# Patient Record
Sex: MALE | Race: WHITE | NOT HISPANIC OR LATINO | Employment: UNEMPLOYED | URBAN - METROPOLITAN AREA
[De-identification: names, ages, dates, MRNs, and addresses within clinical notes are randomized per-mention and may not be internally consistent; named-entity substitution may affect disease eponyms.]

---

## 2017-01-19 ENCOUNTER — OFFICE VISIT (OUTPATIENT)
Dept: UROLOGY | Facility: CLINIC | Age: 47
End: 2017-01-19
Payer: COMMERCIAL

## 2017-01-19 VITALS
HEIGHT: 73 IN | WEIGHT: 225.75 LBS | BODY MASS INDEX: 29.92 KG/M2 | HEART RATE: 59 BPM | DIASTOLIC BLOOD PRESSURE: 89 MMHG | SYSTOLIC BLOOD PRESSURE: 140 MMHG

## 2017-01-19 DIAGNOSIS — R79.89 LOW TESTOSTERONE: Primary | ICD-10-CM

## 2017-01-19 PROCEDURE — 1159F MED LIST DOCD IN RCRD: CPT | Mod: S$GLB,,, | Performed by: UROLOGY

## 2017-01-19 PROCEDURE — 99999 PR PBB SHADOW E&M-NEW PATIENT-LVL III: CPT | Mod: PBBFAC,,, | Performed by: UROLOGY

## 2017-01-19 PROCEDURE — 99213 OFFICE O/P EST LOW 20 MIN: CPT | Mod: S$GLB,,, | Performed by: UROLOGY

## 2017-01-19 RX ORDER — TESTOSTERONE CYPIONATE 200 MG/ML
250 INJECTION, SOLUTION INTRAMUSCULAR
Qty: 10 ML | Refills: 2 | Status: SHIPPED | OUTPATIENT
Start: 2017-01-19 | End: 2017-07-20

## 2017-01-19 RX ORDER — LISINOPRIL 40 MG/1
40 TABLET ORAL DAILY
COMMUNITY
Start: 2017-01-04

## 2017-01-19 RX ORDER — OXYCODONE AND ACETAMINOPHEN 10; 325 MG/1; MG/1
1 TABLET ORAL EVERY 6 HOURS PRN
Status: ON HOLD | COMMUNITY
Start: 2017-01-18 | End: 2018-10-09 | Stop reason: SDUPTHER

## 2017-01-19 RX ORDER — TESTOSTERONE CYPIONATE 200 MG/ML
250 INJECTION, SOLUTION INTRAMUSCULAR
COMMUNITY
Start: 2016-10-28 | End: 2017-10-09 | Stop reason: SDUPTHER

## 2017-01-19 RX ORDER — MULTIVITAMIN
1 TABLET ORAL DAILY
COMMUNITY

## 2017-01-19 RX ORDER — AMLODIPINE BESYLATE 10 MG/1
10 TABLET ORAL DAILY
COMMUNITY
Start: 2017-01-04

## 2017-01-19 NOTE — PROGRESS NOTES
Subjective:       Patient ID: Richard Grey is a 46 y.o. male.    Chief Complaint: Low Testosterone and Medication Refill    HPI     46 year old with symptomatic testosterone deficiency. He has been on IM testosterone replacement for the last 2 years. He is doing well.   His dose was 300 mg every 2 weeks. Peak testosterone was >1500 and we decreased his dose to 250mg every 2 weeks.  He is still doing well but said he notices a decrease in his level.  CBC was normal.  Last PSA 0.7 in 2015. He has mild LUTS. Nocturia x1. No bothered.    Review of Systems   Constitutional: Negative for fever.   Genitourinary: Negative for dysuria and hematuria.       Objective:      Physical Exam   Constitutional: He is oriented to person, place, and time. He appears well-developed and well-nourished.   Pulmonary/Chest: Effort normal.   Abdominal: Soft. He exhibits no distension.   Neurological: He is alert and oriented to person, place, and time.   Skin: No rash noted.   Psychiatric: He has a normal mood and affect.   Vitals reviewed.      Assessment:       1. Low testosterone        Plan:       Low testosterone  -     testosterone cypionate (DEPOTESTOTERONE CYPIONATE) 200 mg/mL injection; Inject 1.25 mLs (250 mg total) into the muscle every 14 (fourteen) days.  Dispense: 10 mL; Refill: 2  -     Prostate Specific Antigen, Diagnostic; Future; Expected date: 1/19/17  -     Testosterone; Future; Expected date: 1/19/17  -     Hematocrit; Future; Expected date: 1/19/17  -     Hemoglobin; Future; Expected date: 1/19/17        Continue at current dose.  Get peak testosterone.  F/u 6 months

## 2017-10-09 ENCOUNTER — OFFICE VISIT (OUTPATIENT)
Dept: UROLOGY | Facility: CLINIC | Age: 47
End: 2017-10-09
Payer: COMMERCIAL

## 2017-10-09 ENCOUNTER — HOSPITAL ENCOUNTER (OUTPATIENT)
Dept: RADIOLOGY | Facility: HOSPITAL | Age: 47
Discharge: HOME | End: 2017-10-09
Attending: UROLOGY
Payer: COMMERCIAL

## 2017-10-09 VITALS
WEIGHT: 232.56 LBS | HEART RATE: 57 BPM | HEIGHT: 73 IN | BODY MASS INDEX: 30.82 KG/M2 | SYSTOLIC BLOOD PRESSURE: 119 MMHG | DIASTOLIC BLOOD PRESSURE: 72 MMHG

## 2017-10-09 DIAGNOSIS — N40.1 ENLARGED PROSTATE WITH URINARY OBSTRUCTION: ICD-10-CM

## 2017-10-09 DIAGNOSIS — R79.89 LOW TESTOSTERONE: Primary | ICD-10-CM

## 2017-10-09 DIAGNOSIS — N20.0 KIDNEY STONE: ICD-10-CM

## 2017-10-09 DIAGNOSIS — N13.8 ENLARGED PROSTATE WITH URINARY OBSTRUCTION: ICD-10-CM

## 2017-10-09 PROCEDURE — 74000 XR ABDOMEN AP 1 VIEW: CPT | Mod: 26,,, | Performed by: RADIOLOGY

## 2017-10-09 PROCEDURE — 99999 PR PBB SHADOW E&M-EST. PATIENT-LVL III: CPT | Mod: PBBFAC,,, | Performed by: UROLOGY

## 2017-10-09 PROCEDURE — 74000 XR ABDOMEN AP 1 VIEW: CPT | Mod: TC,PO

## 2017-10-09 PROCEDURE — 99214 OFFICE O/P EST MOD 30 MIN: CPT | Mod: S$GLB,,, | Performed by: UROLOGY

## 2017-10-09 RX ORDER — TESTOSTERONE CYPIONATE 200 MG/ML
250 INJECTION, SOLUTION INTRAMUSCULAR
Qty: 10 ML | Refills: 3 | Status: SHIPPED | OUTPATIENT
Start: 2017-10-09 | End: 2018-08-03 | Stop reason: SDUPTHER

## 2017-10-09 NOTE — PROGRESS NOTES
Subjective:       Patient ID: Richard Grey is a 47 y.o. male.    Chief Complaint: Low Testosterone and Follow-up    HPI     47 year old with symptomatic testosterone deficiency. He has been on IM testosterone replacement for the last 2 years. He is doing well.   His dose was 300 mg every 2 weeks. Peak testosterone was >1500 and we decreased his dose to 250mg every 2 weeks.  He is still doing well .  He has mild LUTS. Nocturia x1. No bothered.  No recent Labs.  He had a recent x-ray which apparently showed a possible kidney stone.  He has no history of stones.  He denies flank pain.    Review of Systems   Constitutional: Negative for fever.   Genitourinary: Negative for dysuria and hematuria.       Objective:      Physical Exam   Constitutional: He is oriented to person, place, and time. He appears well-developed and well-nourished.   HENT:   Head: Normocephalic and atraumatic.   Eyes: Conjunctivae are normal.   Cardiovascular: Normal rate.    Pulmonary/Chest: Effort normal.   Genitourinary: Rectal exam shows no mass and anal tone normal. Prostate is enlarged (40g s/s/a). Prostate is not tender.   Musculoskeletal: Normal range of motion. He exhibits no edema.   Neurological: He is alert and oriented to person, place, and time.   Skin: Skin is warm and dry. No rash noted.   Psychiatric: He has a normal mood and affect.   Vitals reviewed.      Assessment:       1. Low testosterone    2. Kidney stone    3. Enlarged prostate with urinary obstruction        Plan:       Low testosterone  -     testosterone cypionate (DEPOTESTOTERONE CYPIONATE) 200 mg/mL injection; Inject 1.25 mLs (250 mg total) into the muscle every 14 (fourteen) days.  Dispense: 10 mL; Refill: 3  -     Testosterone; Future; Expected date: 10/09/2017  -     Prostate Specific Antigen, Diagnostic; Future; Expected date: 10/09/2017  -     Hematocrit; Future; Expected date: 10/09/2017  -     Hemoglobin; Future; Expected date: 10/09/2017    Kidney stone  -      X-Ray Abdomen AP 1 View; Future; Expected date: 10/09/2017    Enlarged prostate with urinary obstruction    Other orders  -     Cancel: X-Ray KUB; Future; Expected date: 10/09/2017

## 2017-10-10 ENCOUNTER — TELEPHONE (OUTPATIENT)
Dept: UROLOGY | Facility: CLINIC | Age: 47
End: 2017-10-10

## 2017-10-10 DIAGNOSIS — N20.0 KIDNEY STONE: Primary | ICD-10-CM

## 2017-10-10 NOTE — TELEPHONE ENCOUNTER
Patient informed of KUB results. He would like to have a CT done on Friday if possible per Dr. Vasquez's request.

## 2017-10-10 NOTE — TELEPHONE ENCOUNTER
----- Message from SHELBY Vasquez MD sent at 10/9/2017  5:56 PM CDT -----  Call with KUB results.  Large stone seen 1.3 cm.  I recommend CT scan.

## 2017-10-13 ENCOUNTER — TELEPHONE (OUTPATIENT)
Dept: UROLOGY | Facility: CLINIC | Age: 47
End: 2017-10-13

## 2017-10-13 ENCOUNTER — HOSPITAL ENCOUNTER (OUTPATIENT)
Dept: RADIOLOGY | Facility: HOSPITAL | Age: 47
Discharge: HOME | End: 2017-10-13
Attending: UROLOGY
Payer: COMMERCIAL

## 2017-10-13 DIAGNOSIS — N20.0 KIDNEY STONE: ICD-10-CM

## 2017-10-13 PROCEDURE — 74176 CT ABD & PELVIS W/O CONTRAST: CPT | Mod: 26,,, | Performed by: RADIOLOGY

## 2017-10-13 PROCEDURE — 74176 CT ABD & PELVIS W/O CONTRAST: CPT | Mod: TC,PO

## 2017-10-13 NOTE — TELEPHONE ENCOUNTER
----- Message from SHELBY Vasquez MD sent at 10/13/2017  9:57 AM CDT -----  Call patient.  CT reviewed.  There is a large non-obstructing stone in the left kidney.  Treatment options include observation vs shock wave lithotripsy.  Schedule follow-up if he wants to discuss.

## 2017-10-13 NOTE — TELEPHONE ENCOUNTER
Patient informed and will call the office one speaking with his wife about possible surgery options.

## 2018-08-03 ENCOUNTER — HOSPITAL ENCOUNTER (OUTPATIENT)
Dept: RADIOLOGY | Facility: HOSPITAL | Age: 48
Discharge: HOME | End: 2018-08-03
Attending: UROLOGY
Payer: COMMERCIAL

## 2018-08-03 ENCOUNTER — OFFICE VISIT (OUTPATIENT)
Dept: UROLOGY | Facility: CLINIC | Age: 48
End: 2018-08-03
Payer: COMMERCIAL

## 2018-08-03 VITALS — HEIGHT: 73 IN | WEIGHT: 225.13 LBS | BODY MASS INDEX: 29.84 KG/M2

## 2018-08-03 DIAGNOSIS — R79.89 LOW TESTOSTERONE: ICD-10-CM

## 2018-08-03 DIAGNOSIS — R79.89 LOW TESTOSTERONE LEVEL IN MALE: Primary | ICD-10-CM

## 2018-08-03 DIAGNOSIS — N20.0 KIDNEY STONE: ICD-10-CM

## 2018-08-03 PROCEDURE — 74018 RADEX ABDOMEN 1 VIEW: CPT | Mod: 26,,, | Performed by: RADIOLOGY

## 2018-08-03 PROCEDURE — 74018 RADEX ABDOMEN 1 VIEW: CPT | Mod: TC,FY,PO

## 2018-08-03 PROCEDURE — 99214 OFFICE O/P EST MOD 30 MIN: CPT | Mod: S$GLB,,, | Performed by: UROLOGY

## 2018-08-03 PROCEDURE — 99999 PR PBB SHADOW E&M-EST. PATIENT-LVL III: CPT | Mod: PBBFAC,,, | Performed by: UROLOGY

## 2018-08-03 PROCEDURE — 3008F BODY MASS INDEX DOCD: CPT | Mod: CPTII,S$GLB,, | Performed by: UROLOGY

## 2018-08-03 RX ORDER — TESTOSTERONE CYPIONATE 200 MG/ML
250 INJECTION, SOLUTION INTRAMUSCULAR
Qty: 10 ML | Refills: 3 | Status: SHIPPED | OUTPATIENT
Start: 2018-08-03 | End: 2019-05-08

## 2018-08-03 NOTE — PROGRESS NOTES
Subjective:       Patient ID: Richard Grey is a 48 y.o. male.    Chief Complaint: Nephrolithiasis    HPI     48 year old with symptomatic testosterone deficiency.  He has been on IM testosterone replacement for 2 years. He is doing well.   His dose was 300 mg every 2 weeks. Peak testosterone was >1500 and we decreased his dose to 250mg every 2 weeks.  He is still doing well .  He has mild LUTS.  Nocturia x1.  No bothered.  No recent Labs.  He had a CT scan which showed a left kidney stone measuring 12 mm.  He has no history of stones.  He denies flank pain.    Review of Systems   Constitutional: Negative for fever.   Genitourinary: Negative for dysuria and hematuria.       Objective:      Physical Exam   Constitutional: He is oriented to person, place, and time. He appears well-developed and well-nourished.   HENT:   Head: Normocephalic and atraumatic.   Eyes: Conjunctivae are normal.   Cardiovascular: Normal rate.    Pulmonary/Chest: Effort normal.   Abdominal: Soft. Normal appearance. There is no tenderness. There is no CVA tenderness.   Musculoskeletal: Normal range of motion. He exhibits no edema.   Neurological: He is alert and oriented to person, place, and time.   Skin: Skin is warm and dry. No rash noted.   Psychiatric: He has a normal mood and affect.   Vitals reviewed.      Assessment:       1. Low testosterone level in male    2. Kidney stone    3. Low testosterone        Plan:       Low testosterone level in male    Kidney stone  -     X-Ray Abdomen AP 1 View; Future; Expected date: 08/03/2018    Low testosterone  -     testosterone cypionate (DEPOTESTOTERONE CYPIONATE) 200 mg/mL injection; Inject 1.25 mLs (250 mg total) into the muscle every 14 (fourteen) days.  Dispense: 10 mL; Refill: 3      Get labs today. (4 days post injection)  We discussed treatment options for the stone.  He is interested in ESWL.  He will call to schedule.

## 2018-08-09 ENCOUNTER — TELEPHONE (OUTPATIENT)
Dept: UROLOGY | Facility: CLINIC | Age: 48
End: 2018-08-09

## 2018-08-09 DIAGNOSIS — N20.0 KIDNEY STONE: Primary | ICD-10-CM

## 2018-08-09 NOTE — TELEPHONE ENCOUNTER
----- Message from Catrachita Payton sent at 8/9/2018  3:36 PM CDT -----  Type:  Patient Returning Call    Who Called:  Patient  Who Left Message for Patient:  Hillary  Does the patient know what this is regarding?:  NA  Best Call Back Number:  [phone]  Additional Information:

## 2018-08-09 NOTE — TELEPHONE ENCOUNTER
----- Message from Mami Andrade sent at 8/9/2018  3:10 PM CDT -----  Type:  Test Results    Who Called:  Patient  Name of Test (Lab/Mammo/Etc):  Lab  Date of Test:  8/3/18  Ordering Provider:  Same  Where the test was performed:  [facility]  Best Call Back Number:  [phone]

## 2018-10-08 ENCOUNTER — ANESTHESIA EVENT (OUTPATIENT)
Dept: SURGERY | Facility: HOSPITAL | Age: 48
End: 2018-10-08
Payer: COMMERCIAL

## 2018-10-09 ENCOUNTER — HOSPITAL ENCOUNTER (OUTPATIENT)
Facility: HOSPITAL | Age: 48
Discharge: HOME | End: 2018-10-09
Attending: UROLOGY | Admitting: UROLOGY
Payer: COMMERCIAL

## 2018-10-09 ENCOUNTER — ANESTHESIA (OUTPATIENT)
Dept: SURGERY | Facility: HOSPITAL | Age: 48
End: 2018-10-09
Payer: COMMERCIAL

## 2018-10-09 VITALS
HEIGHT: 74 IN | TEMPERATURE: 98 F | DIASTOLIC BLOOD PRESSURE: 80 MMHG | BODY MASS INDEX: 28.23 KG/M2 | HEART RATE: 56 BPM | OXYGEN SATURATION: 100 % | RESPIRATION RATE: 18 BRPM | WEIGHT: 220 LBS | SYSTOLIC BLOOD PRESSURE: 151 MMHG

## 2018-10-09 DIAGNOSIS — N20.0 KIDNEY STONE: Primary | ICD-10-CM

## 2018-10-09 DIAGNOSIS — N20.0 KIDNEY STONE ON LEFT SIDE: ICD-10-CM

## 2018-10-09 PROCEDURE — 63600175 PHARM REV CODE 636 W HCPCS: Mod: PO | Performed by: UROLOGY

## 2018-10-09 PROCEDURE — 63600175 PHARM REV CODE 636 W HCPCS: Mod: PO | Performed by: ANESTHESIOLOGY

## 2018-10-09 PROCEDURE — 25000003 PHARM REV CODE 250: Mod: PO | Performed by: NURSE ANESTHETIST, CERTIFIED REGISTERED

## 2018-10-09 PROCEDURE — 50590 FRAGMENTING OF KIDNEY STONE: CPT | Mod: LT,,, | Performed by: UROLOGY

## 2018-10-09 PROCEDURE — 37000009 HC ANESTHESIA EA ADD 15 MINS: Mod: PO | Performed by: UROLOGY

## 2018-10-09 PROCEDURE — 36000705 HC OR TIME LEV I EA ADD 15 MIN: Mod: PO | Performed by: UROLOGY

## 2018-10-09 PROCEDURE — D9220A PRA ANESTHESIA: Mod: CRNA,,, | Performed by: NURSE ANESTHETIST, CERTIFIED REGISTERED

## 2018-10-09 PROCEDURE — 36000704 HC OR TIME LEV I 1ST 15 MIN: Mod: PO | Performed by: UROLOGY

## 2018-10-09 PROCEDURE — 25000003 PHARM REV CODE 250: Mod: PO | Performed by: ANESTHESIOLOGY

## 2018-10-09 PROCEDURE — 71000039 HC RECOVERY, EACH ADD'L HOUR: Mod: PO | Performed by: UROLOGY

## 2018-10-09 PROCEDURE — 37000008 HC ANESTHESIA 1ST 15 MINUTES: Mod: PO | Performed by: UROLOGY

## 2018-10-09 PROCEDURE — D9220A PRA ANESTHESIA: Mod: ANES,,, | Performed by: ANESTHESIOLOGY

## 2018-10-09 PROCEDURE — 63600175 PHARM REV CODE 636 W HCPCS: Mod: PO | Performed by: NURSE ANESTHETIST, CERTIFIED REGISTERED

## 2018-10-09 PROCEDURE — 71000033 HC RECOVERY, INTIAL HOUR: Mod: PO | Performed by: UROLOGY

## 2018-10-09 RX ORDER — PROPOFOL 10 MG/ML
VIAL (ML) INTRAVENOUS CONTINUOUS PRN
Status: DISCONTINUED | OUTPATIENT
Start: 2018-10-09 | End: 2018-10-09

## 2018-10-09 RX ORDER — CEFAZOLIN SODIUM 2 G/50ML
2 SOLUTION INTRAVENOUS
Status: COMPLETED | OUTPATIENT
Start: 2018-10-09 | End: 2018-10-09

## 2018-10-09 RX ORDER — DEXAMETHASONE SODIUM PHOSPHATE 4 MG/ML
8 INJECTION, SOLUTION INTRA-ARTICULAR; INTRALESIONAL; INTRAMUSCULAR; INTRAVENOUS; SOFT TISSUE
Status: COMPLETED | OUTPATIENT
Start: 2018-10-09 | End: 2018-10-09

## 2018-10-09 RX ORDER — MIDAZOLAM HYDROCHLORIDE 1 MG/ML
INJECTION, SOLUTION INTRAMUSCULAR; INTRAVENOUS
Status: DISCONTINUED | OUTPATIENT
Start: 2018-10-09 | End: 2018-10-09

## 2018-10-09 RX ORDER — HYDROMORPHONE HYDROCHLORIDE 2 MG/ML
0.2 INJECTION, SOLUTION INTRAMUSCULAR; INTRAVENOUS; SUBCUTANEOUS EVERY 5 MIN PRN
Status: DISCONTINUED | OUTPATIENT
Start: 2018-10-09 | End: 2018-10-09 | Stop reason: HOSPADM

## 2018-10-09 RX ORDER — LIDOCAINE HYDROCHLORIDE 10 MG/ML
0.5 INJECTION, SOLUTION EPIDURAL; INFILTRATION; INTRACAUDAL; PERINEURAL ONCE AS NEEDED
Status: DISCONTINUED | OUTPATIENT
Start: 2018-10-09 | End: 2018-10-09

## 2018-10-09 RX ORDER — SODIUM CHLORIDE 0.9 % (FLUSH) 0.9 %
3 SYRINGE (ML) INJECTION
Status: DISCONTINUED | OUTPATIENT
Start: 2018-10-09 | End: 2018-10-09 | Stop reason: HOSPADM

## 2018-10-09 RX ORDER — LIDOCAINE HYDROCHLORIDE 10 MG/ML
1 INJECTION, SOLUTION EPIDURAL; INFILTRATION; INTRACAUDAL; PERINEURAL ONCE
Status: DISCONTINUED | OUTPATIENT
Start: 2018-10-09 | End: 2018-10-09 | Stop reason: HOSPADM

## 2018-10-09 RX ORDER — OXYCODONE AND ACETAMINOPHEN 10; 325 MG/1; MG/1
1 TABLET ORAL EVERY 6 HOURS PRN
Qty: 20 TABLET | Refills: 0 | Status: SHIPPED | OUTPATIENT
Start: 2018-10-09

## 2018-10-09 RX ORDER — GLYCOPYRROLATE 0.2 MG/ML
INJECTION INTRAMUSCULAR; INTRAVENOUS
Status: DISCONTINUED | OUTPATIENT
Start: 2018-10-09 | End: 2018-10-09

## 2018-10-09 RX ORDER — SODIUM CHLORIDE, SODIUM LACTATE, POTASSIUM CHLORIDE, CALCIUM CHLORIDE 600; 310; 30; 20 MG/100ML; MG/100ML; MG/100ML; MG/100ML
INJECTION, SOLUTION INTRAVENOUS CONTINUOUS
Status: DISCONTINUED | OUTPATIENT
Start: 2018-10-09 | End: 2018-10-09 | Stop reason: HOSPADM

## 2018-10-09 RX ORDER — FENTANYL CITRATE 50 UG/ML
INJECTION, SOLUTION INTRAMUSCULAR; INTRAVENOUS
Status: DISCONTINUED | OUTPATIENT
Start: 2018-10-09 | End: 2018-10-09

## 2018-10-09 RX ORDER — LIDOCAINE HCL/PF 100 MG/5ML
SYRINGE (ML) INTRAVENOUS
Status: DISCONTINUED | OUTPATIENT
Start: 2018-10-09 | End: 2018-10-09

## 2018-10-09 RX ORDER — SODIUM CHLORIDE 9 MG/ML
INJECTION, SOLUTION INTRAVENOUS CONTINUOUS
Status: DISCONTINUED | OUTPATIENT
Start: 2018-10-09 | End: 2018-10-09

## 2018-10-09 RX ORDER — KETAMINE HYDROCHLORIDE 100 MG/ML
INJECTION, SOLUTION INTRAMUSCULAR; INTRAVENOUS
Status: DISCONTINUED | OUTPATIENT
Start: 2018-10-09 | End: 2018-10-09

## 2018-10-09 RX ORDER — PROPOFOL 10 MG/ML
VIAL (ML) INTRAVENOUS
Status: DISCONTINUED | OUTPATIENT
Start: 2018-10-09 | End: 2018-10-09

## 2018-10-09 RX ORDER — OXYCODONE HYDROCHLORIDE 5 MG/1
5 TABLET ORAL
Status: DISCONTINUED | OUTPATIENT
Start: 2018-10-09 | End: 2018-10-09 | Stop reason: HOSPADM

## 2018-10-09 RX ADMIN — LIDOCAINE HYDROCHLORIDE 75 MG: 20 INJECTION PARENTERAL at 07:10

## 2018-10-09 RX ADMIN — PROPOFOL 100 MCG/KG/MIN: 10 INJECTION, EMULSION INTRAVENOUS at 07:10

## 2018-10-09 RX ADMIN — FENTANYL CITRATE 50 MCG: 50 INJECTION, SOLUTION INTRAMUSCULAR; INTRAVENOUS at 07:10

## 2018-10-09 RX ADMIN — CEFAZOLIN SODIUM 2 G: 2 SOLUTION INTRAVENOUS at 07:10

## 2018-10-09 RX ADMIN — SODIUM CHLORIDE, SODIUM LACTATE, POTASSIUM CHLORIDE, AND CALCIUM CHLORIDE: .6; .31; .03; .02 INJECTION, SOLUTION INTRAVENOUS at 06:10

## 2018-10-09 RX ADMIN — MIDAZOLAM HYDROCHLORIDE 2 MG: 1 INJECTION, SOLUTION INTRAMUSCULAR; INTRAVENOUS at 06:10

## 2018-10-09 RX ADMIN — KETAMINE HYDROCHLORIDE 25 MG: 100 INJECTION, SOLUTION, CONCENTRATE INTRAMUSCULAR; INTRAVENOUS at 07:10

## 2018-10-09 RX ADMIN — GLYCOPYRROLATE 0.2 MG: 0.2 INJECTION, SOLUTION INTRAMUSCULAR; INTRAVENOUS at 07:10

## 2018-10-09 RX ADMIN — DEXAMETHASONE SODIUM PHOSPHATE 8 MG: 4 INJECTION, SOLUTION INTRAMUSCULAR; INTRAVENOUS at 06:10

## 2018-10-09 RX ADMIN — LIDOCAINE HYDROCHLORIDE 25 MG: 20 INJECTION PARENTERAL at 07:10

## 2018-10-09 RX ADMIN — PROPOFOL 75 MG: 10 INJECTION, EMULSION INTRAVENOUS at 07:10

## 2018-10-09 RX ADMIN — LIDOCAINE HYDROCHLORIDE 100 MG: 20 INJECTION PARENTERAL at 07:10

## 2018-10-09 NOTE — H&P
HPI      48 year old with symptomatic testosterone deficiency.  He has been on IM testosterone replacement for 2 years. He is doing well.   His dose was 300 mg every 2 weeks. Peak testosterone was >1500 and we decreased his dose to 250mg every 2 weeks.  He is still doing well .  He has mild LUTS.  Nocturia x1.  No bothered.  No recent Labs.  He had a CT scan which showed a left kidney stone measuring 12 mm.  He has no history of stones.  He denies flank pain.     Review of Systems   Constitutional: Negative for fever.   Genitourinary: Negative for dysuria and hematuria.       Objective:   Physical Exam   Constitutional: He is oriented to person, place, and time. He appears well-developed and well-nourished.   HENT:   Head: Normocephalic and atraumatic.   Eyes: Conjunctivae are normal.   Cardiovascular: Normal rate.    Pulmonary/Chest: Effort normal.   Abdominal: Soft. Normal appearance. There is no tenderness. There is no CVA tenderness.   Musculoskeletal: Normal range of motion. He exhibits no edema.   Neurological: He is alert and oriented to person, place, and time.   Skin: Skin is warm and dry. No rash noted.   Psychiatric: He has a normal mood and affect.   Vitals reviewed.      Assessment:       1. Low testosterone level in male    2. Kidney stone    3. Low testosterone        Plan:       Low testosterone level in male     Kidney stone  -     X-Ray Abdomen AP 1 View; Future; Expected date: 08/03/2018     Low testosterone  -     testosterone cypionate (DEPOTESTOTERONE CYPIONATE) 200 mg/mL injection; Inject 1.25 mLs (250 mg total) into the muscle every 14 (fourteen) days.  Dispense: 10 mL; Refill: 3       Get labs today. (4 days post injection)  We discussed treatment options for stone.  Plan left ESWL

## 2018-10-09 NOTE — TRANSFER OF CARE
"Anesthesia Transfer of Care Note    Patient: Richard Grey    Procedure(s) Performed: Procedure(s) (LRB):  LITHOTRIPSY, ESWL (Left)    Patient location: PACU    Anesthesia Type: general    Transport from OR: Transported from OR on room air with adequate spontaneous ventilation    Post pain: adequate analgesia    Post assessment: no apparent anesthetic complications and tolerated procedure well    Post vital signs: stable    Level of consciousness: awake    Nausea/Vomiting: no nausea/vomiting    Complications: none    Transfer of care protocol was followed      Last vitals:   Visit Vitals  /75 (BP Location: Right arm, Patient Position: Lying)   Pulse 62   Temp 36.6 °C (97.9 °F) (Skin)   Resp 18   Ht 6' 2" (1.88 m)   Wt 99.8 kg (220 lb)   SpO2 95%   BMI 28.25 kg/m²     "

## 2018-10-09 NOTE — DISCHARGE INSTRUCTIONS
Discharge Instructions: After Your Surgery  Youve just had surgery. During surgery, you were given medicine called anesthesia to keep you relaxed and free of pain. After surgery, you may have some pain or nausea. This is common. Here are some tips for feeling better and getting well after surgery.     Stay on schedule with your medicine.   Going home  Your healthcare provider will show you how to take care of yourself when you go home. He or she will also answer your questions. Have an adult family member or friend drive you home. For the first 24 hours after your surgery:  · Do not drive or use heavy equipment.  · Do not make important decisions or sign legal papers.  · Do not drink alcohol.  · Have someone stay with you, if needed. He or she can watch for problems and help keep you safe.  Be sure to go to all follow-up visits with your healthcare provider. And rest after your surgery for as long as your healthcare provider tells you to.  Coping with pain  If you have pain after surgery, pain medicine will help you feel better. Take it as told, before pain becomes severe. Also, ask your healthcare provider or pharmacist about other ways to control pain. This might be with heat, ice, or relaxation. And follow any other instructions your surgeon or nurse gives you.  Tips for taking pain medicine  To get the best relief possible, remember these points:  · Pain medicines can upset your stomach. Taking them with a little food may help.  · Most pain relievers taken by mouth need at least 20 to 30 minutes to start to work.  · Taking medicine on a schedule can help you remember to take it. Try to time your medicine so that you can take it before starting an activity. This might be before you get dressed, go for a walk, or sit down for dinner.  · Constipation is a common side effect of pain medicines. Call your healthcare provider before taking any medicines such as laxatives or stool softeners to help ease constipation.  Also ask if you should skip any foods. Drinking lots of fluids and eating foods such as fruits and vegetables that are high in fiber can also help. Remember, do not take laxatives unless your surgeon has prescribed them.  · Drinking alcohol and taking pain medicine can cause dizziness and slow your breathing. It can even be deadly. Do not drink alcohol while taking pain medicine.  · Pain medicine can make you react more slowly to things. Do not drive or run machinery while taking pain medicine.  Your healthcare provider may tell you to take acetaminophen to help ease your pain. Ask him or her how much you are supposed to take each day. Acetaminophen or other pain relievers may interact with your prescription medicines or other over-the-counter (OTC) medicines. Some prescription medicines have acetaminophen and other ingredients. Using both prescription and OTC acetaminophen for pain can cause you to overdose. Read the labels on your OTC medicines with care. This will help you to clearly know the list of ingredients, how much to take, and any warnings. It may also help you not take too much acetaminophen. If you have questions or do not understand the information, ask your pharmacist or healthcare provider to explain it to you before you take the OTC medicine.  Managing nausea  Some people have an upset stomach after surgery. This is often because of anesthesia, pain, or pain medicine, or the stress of surgery. These tips will help you handle nausea and eat healthy foods as you get better. If you were on a special food plan before surgery, ask your healthcare provider if you should follow it while you get better. These tips may help:  · Do not push yourself to eat. Your body will tell you when to eat and how much.  · Start off with clear liquids and soup. They are easier to digest.  · Next try semi-solid foods, such as mashed potatoes, applesauce, and gelatin, as you feel ready.  · Slowly move to solid foods. Dont  eat fatty, rich, or spicy foods at first.  · Do not force yourself to have 3 large meals a day. Instead eat smaller amounts more often.  · Take pain medicines with a small amount of solid food, such as crackers or toast, to avoid nausea.     Call your surgeon if  · You still have pain an hour after taking medicine. The medicine may not be strong enough.  · You feel too sleepy, dizzy, or groggy. The medicine may be too strong.  · You have side effects like nausea, vomiting, or skin changes, such as rash, itching, or hives.       If you have obstructive sleep apnea  You were given anesthesia medicine during surgery to keep you comfortable and free of pain. After surgery, you may have more apnea spells because of this medicine and other medicines you were given. The spells may last longer than usual.   At home:  · Keep using the continuous positive airway pressure (CPAP) device when you sleep. Unless your healthcare provider tells you not to, use it when you sleep, day or night. CPAP is a common device used to treat obstructive sleep apnea.  · Talk with your provider before taking any pain medicine, muscle relaxants, or sedatives. Your provider will tell you about the possible dangers of taking these medicines.  Date Last Reviewed: 12/1/2016 © 2000-2017 [employer]. [address] [place] [address], [place] [address]. All rights reserved. This information is not intended as a substitute for professional medical care. Always follow your healthcare professional's instructions.        LITHOTRIPSY      DOS:   Minimal activity for 24 hours.   May shower or tub bathe today   Advance diet as tolerated.   Drink a lot of liquids until you see your doctor.   Strain all urine. Collect fragments/stones in the container provided. Bring to your follow up appointment.   Resume home medications as prescribed    DONT:   No driving for 24 hours or while taking narcotic pain medication   DO NOT TAKE ADDITIONAL  TYLENOL/ACETAMINOPHEN WHILE TAKING NARCOTIC PAIN MEDICATION THAT CONTAINS TYLENOL/ACETAMINOPHEN.    CALL PHYSICIAN FOR:   Unable to urinate within 6 hours after surgery.   Fever>101   Persistent pain not relieved by pain medication   Bloody urine with significant clots.    Contact your physician for emergencies at [phone]    Make return appointment for 2 weeks t [phone]

## 2018-10-09 NOTE — DISCHARGE SUMMARY
[facility]  Discharge Note  Short Stay    Admit Date: 10/9/2018    Discharge Date and Time: No discharge date for patient encounter.     Attending Physician: SHELBY Vasquez MD     Discharge Provider: AUGUSTINE Vasquez    Diagnoses:  Active Hospital Problems    Diagnosis  POA    Kidney stone on left side [N20.0]  Yes      Resolved Hospital Problems   No resolved problems to display.       Discharged Condition: good    Hospital Course: Patient was admitted for an outpatient procedure and tolerated the procedure well with no complications.    Final Diagnoses: Same as principal problem.    Disposition: Home or Self Care    Follow up/Patient Instructions:    Medications:  Reconciled Home Medications:      Medication List      CONTINUE taking these medications    amLODIPine 10 MG tablet  Commonly known as:  NORVASC  Take 10 mg by mouth once daily.     lisinopril 40 MG tablet  Commonly known as:  PRINIVIL,ZESTRIL  Take 40 mg by mouth once daily.     multivitamin per tablet  Commonly known as:  THERAGRAN  Take 1 tablet by mouth once daily.     oxyCODONE-acetaminophen  mg per tablet  Commonly known as:  PERCOCET  Take 1 tablet by mouth every 6 (six) hours as needed.     testosterone cypionate 200 mg/mL injection  Commonly known as:  DEPOTESTOTERONE CYPIONATE  Inject 1.25 mLs (250 mg total) into the muscle every 14 (fourteen) days.          Discharge Procedure Orders   Diet Adult Regular     Call MD for:  temperature >100.4     Call MD for:  persistent nausea and vomiting or diarrhea     Call MD for:  severe uncontrolled pain     Activity as tolerated   Scheduling Instructions: Keep hydrated  Strain Urine  RTC 2 weeks         Discharge Procedure Orders (must include Diet, Follow-up, Activity):   Discharge Procedure Orders (must include Diet, Follow-up, Activity)   Diet Adult Regular     Call MD for:  temperature >100.4     Call MD for:  persistent nausea and vomiting or diarrhea     Call MD for:  severe  uncontrolled pain     Activity as tolerated   Scheduling Instructions: Keep hydrated  Strain Urine  RTC 2 weeks

## 2018-10-09 NOTE — ANESTHESIA POSTPROCEDURE EVALUATION
"Anesthesia Post Evaluation    Patient: Richard Grey    Procedure(s) Performed: Procedure(s) (LRB):  LITHOTRIPSY, ESWL (Left)    Final Anesthesia Type: general  Patient location during evaluation: PACU  Patient participation: Yes- Able to Participate  Level of consciousness: awake and alert, oriented and awake  Post-procedure vital signs: reviewed and stable  Pain management: adequate  Airway patency: patent  PONV status at discharge: No PONV  Anesthetic complications: no      Cardiovascular status: blood pressure returned to baseline and hemodynamically stable  Respiratory status: unassisted, spontaneous ventilation and room air  Hydration status: euvolemic  Follow-up not needed.        Visit Vitals  BP (!) 151/80 (BP Location: Left arm, Patient Position: Lying)   Pulse (!) 56   Temp 36.6 °C (97.8 °F) (Skin)   Resp 18   Ht 6' 2" (1.88 m)   Wt 99.8 kg (220 lb)   SpO2 100%   BMI 28.25 kg/m²       Pain/Sonali Score: Pain Assessment Performed: Yes (10/9/2018  8:15 AM)  Presence of Pain: denies (10/9/2018  8:15 AM)  Sonali Score: 10 (10/9/2018  8:15 AM)        "

## 2018-10-09 NOTE — OP NOTE
[facility]  Surgery Department  Operative Note    SUMMARY     Date of Procedure: 10/9/2018     Procedure: Procedure(s) (LRB):  LITHOTRIPSY, ESWL (Left)     Surgeon(s) and Role:     * SHELBY Vasquez MD - Primary    Assisting Surgeon: None    Pre-Operative Diagnosis: Kidney stone [N20.0]    Post-Operative Diagnosis: Post-Op Diagnosis Codes:     * Kidney stone [N20.0]    Anesthesia: General    Technical Procedures Used: shock wave lithotripsy    Description of the Findings of the Procedure: 12 mm stone in left lower pole    Significant Surgical Tasks Conducted by the Assistant(s), if Applicable: n/a    Complications: No    Estimated Blood Loss (EBL): * No values recorded between 10/9/2018  7:10 AM and 10/9/2018  7:42 AM *           Implants: * No implants in log *    Specimens:   Specimen (12h ago, onward)    None                  Condition: Good    Disposition: PACU - hemodynamically stable.    Attestation: I performed the procedure.

## 2018-10-09 NOTE — OP NOTE
DATE OF PROCEDURE:  10/09/2018    PREOPERATIVE DIAGNOSIS:  Left kidney stone.    POSTOPERATIVE DIAGNOSIS:  Left kidney stone.    OPERATIVE PROCEDURE:  Extracorporeal shockwave lithotripsy.    ANESTHESIA:  General.    COMPLICATIONS:  None.    SPECIMEN:  None.    BLOOD LOSS:  None.    SURGEON:  Gonzalez Vasquez M.D.    ASSISTANT:  None.    INDICATIONS:  Mr. Grey is a 48-year-old male with intermittent left flank   pain.  Imaging showed a stone in the left lower pole measuring 12 mm.  We   discussed treatment options and he elected to proceed with shockwave   lithotripsy.    PROCEDURE IN DETAIL:  After informed consent was obtained, he was brought to the   Operating Room.  He was given preoperative antibiotics.  He was placed supine   on the lithotripter table.  After adequate general anesthesia was achieved, we   used fluoroscopy and we were able to visualize the stone.  The stone measured   approximately 12 mm in the left lower pole.  We focused the shock waves on the   stone using fluoroscopy in multiple planes.  We then delivered a total of 3000   shocks at 26 kilovolts.  There appeared to be adequate fragmentation of the   stone on subsequent fluoroscopic images.  He tolerated the procedure well.    There were no complications.  He was awakened and transported to the PACU in   stable condition.      WC/IN  dd: 10/09/2018 07:49:54 (CDT)  td: 10/09/2018 11:02:27 (CDT)  Doc ID   #[id number]  Job ID #[id number]    CC:

## 2018-10-09 NOTE — ANESTHESIA PREPROCEDURE EVALUATION
10/09/2018  Richard Grey is a 48 y.o., male.    Anesthesia Evaluation    I have reviewed the Patient Summary Reports.    I have reviewed the Nursing Notes.      Review of Systems  Anesthesia Hx:  No problems with previous Anesthesia    Cardiovascular:   Hypertension    Renal/:   Chronic Renal Disease    Neurological:   Seizures        Physical Exam  General:  Well nourished    Airway/Jaw/Neck:  Airway Findings: Mouth Opening: Normal Tongue: Normal  General Airway Assessment: Adult  Mallampati: II  TM Distance: Normal, at least 6 cm  Jaw/Neck Findings:  Neck ROM: Normal ROM     Eyes/Ears/Nose:  Eyes/Ears/Nose Findings:    Dental:  Dental Findings:   Chest/Lungs:  Chest/Lungs Findings: Normal Respiratory Rate     Heart/Vascular:  Heart Findings: Rate: Normal  Rhythm: Regular Rhythm        Mental Status:  Mental Status Findings:  Cooperative, Alert and Oriented         Anesthesia Plan  Type of Anesthesia, risks & benefits discussed:  Anesthesia Type:  general  Patient's Preference: General  Intra-op Monitoring Plan: standard ASA monitors  Intra-op Monitoring Plan Comments:   Post Op Pain Control Plan:   Post Op Pain Control Plan Comments:   Induction:   IV  Beta Blocker:  Patient is not currently on a Beta-Blocker (No further documentation required).       Informed Consent: Patient understands risks and agrees with Anesthesia plan.  Questions answered. Anesthesia consent signed with patient.  ASA Score: 2     Day of Surgery Review of History & Physical:    H&P update referred to the surgeon.         Ready For Surgery From Anesthesia Perspective.

## 2018-10-16 DIAGNOSIS — N20.0 KIDNEY STONE: Primary | ICD-10-CM

## 2019-04-17 ENCOUNTER — TELEPHONE (OUTPATIENT)
Dept: UROLOGY | Facility: CLINIC | Age: 49
End: 2019-04-17

## 2019-04-17 DIAGNOSIS — R79.89 LOW TESTOSTERONE IN MALE: Primary | ICD-10-CM

## 2019-04-17 NOTE — TELEPHONE ENCOUNTER
Entered orders for labs, advised patient he is overdue for follow-up so medication could not be refilled. Pt has appointment on 5/8

## 2019-04-20 ENCOUNTER — LAB VISIT (OUTPATIENT)
Dept: LAB | Facility: HOSPITAL | Age: 49
End: 2019-04-20
Attending: UROLOGY
Payer: COMMERCIAL

## 2019-04-20 DIAGNOSIS — R79.89 LOW TESTOSTERONE IN MALE: ICD-10-CM

## 2019-04-20 LAB
HCT VFR BLD AUTO: 47.6 % (ref 40–54)
HGB BLD-MCNC: 15.9 G/DL (ref 14–18)
TESTOST SERPL-MCNC: 1054 NG/DL (ref 304–1227)

## 2019-04-20 PROCEDURE — 36415 COLL VENOUS BLD VENIPUNCTURE: CPT | Mod: PO

## 2019-04-20 PROCEDURE — 84403 ASSAY OF TOTAL TESTOSTERONE: CPT

## 2019-04-20 PROCEDURE — 85014 HEMATOCRIT: CPT

## 2019-04-20 PROCEDURE — 85018 HEMOGLOBIN: CPT

## 2019-05-03 ENCOUNTER — HOSPITAL ENCOUNTER (OUTPATIENT)
Dept: RADIOLOGY | Facility: HOSPITAL | Age: 49
Discharge: HOME | End: 2019-05-03
Attending: UROLOGY
Payer: COMMERCIAL

## 2019-05-03 DIAGNOSIS — N20.0 KIDNEY STONE: ICD-10-CM

## 2019-05-03 PROCEDURE — 74018 RADEX ABDOMEN 1 VIEW: CPT | Mod: TC,FY,PO

## 2019-05-03 PROCEDURE — 74018 XR ABDOMEN AP 1 VIEW: ICD-10-PCS | Mod: 26,,, | Performed by: RADIOLOGY

## 2019-05-03 PROCEDURE — 74018 RADEX ABDOMEN 1 VIEW: CPT | Mod: 26,,, | Performed by: RADIOLOGY

## 2019-05-08 ENCOUNTER — OFFICE VISIT (OUTPATIENT)
Dept: UROLOGY | Facility: CLINIC | Age: 49
End: 2019-05-08
Payer: COMMERCIAL

## 2019-05-08 VITALS — BODY MASS INDEX: 27.19 KG/M2 | WEIGHT: 211.88 LBS | HEIGHT: 74 IN

## 2019-05-08 DIAGNOSIS — Z12.5 SCREENING FOR PROSTATE CANCER: ICD-10-CM

## 2019-05-08 DIAGNOSIS — R79.89 LOW TESTOSTERONE LEVEL IN MALE: Primary | ICD-10-CM

## 2019-05-08 DIAGNOSIS — E29.1 MALE HYPOGONADISM: Primary | ICD-10-CM

## 2019-05-08 PROCEDURE — 3008F BODY MASS INDEX DOCD: CPT | Mod: CPTII,S$GLB,, | Performed by: UROLOGY

## 2019-05-08 PROCEDURE — 99213 PR OFFICE/OUTPT VISIT, EST, LEVL III, 20-29 MIN: ICD-10-PCS | Mod: S$GLB,,, | Performed by: UROLOGY

## 2019-05-08 PROCEDURE — 99999 PR PBB SHADOW E&M-EST. PATIENT-LVL II: CPT | Mod: PBBFAC,,, | Performed by: UROLOGY

## 2019-05-08 PROCEDURE — 3008F PR BODY MASS INDEX (BMI) DOCUMENTED: ICD-10-PCS | Mod: CPTII,S$GLB,, | Performed by: UROLOGY

## 2019-05-08 PROCEDURE — 99999 PR PBB SHADOW E&M-EST. PATIENT-LVL II: ICD-10-PCS | Mod: PBBFAC,,, | Performed by: UROLOGY

## 2019-05-08 PROCEDURE — 99213 OFFICE O/P EST LOW 20 MIN: CPT | Mod: S$GLB,,, | Performed by: UROLOGY

## 2019-05-08 RX ORDER — TESTOSTERONE CYPIONATE 200 MG/ML
200 INJECTION, SOLUTION INTRAMUSCULAR
Qty: 10 ML | Refills: 1 | Status: SHIPPED | OUTPATIENT
Start: 2019-05-08 | End: 2020-03-30 | Stop reason: SDUPTHER

## 2019-05-08 NOTE — TELEPHONE ENCOUNTER
----- Message from Romana Ohara sent at 5/8/2019  1:00 PM CDT -----  Contact: Patient  Type:  RX Refill Request    Who Called:  Patient  Refill or New Rx:  refill  RX Name and Strength:  testosterone cypionate (DEPOTESTOTERONE CYPIONATE) 200 mg/mL injection  How is the patient currently taking it? (ex. 1XDay):  Every two weeks  Is this a 30 day or 90 day RX:  90 days  Preferred Pharmacy with phone number:    [facility] - [facility] [address] [place]  [address]  [address] [place] [id number]  Phone: [phone] Fax: [phone]  Local or Mail Order:  local  Ordering Provider:  Dr. Christina Denson Call Back Number:  [phone] (home)    Additional Information:  amy

## 2019-05-08 NOTE — PROGRESS NOTES
Subjective:       Patient ID: Richard Grey is a 49 y.o. male.    Chief Complaint: Annual Exam and Low Testosterone    HPI     49 year old with symptomatic testosterone deficiency.  He has been on IM testosterone replacement for 2 years. He is doing well.   His dose was 300 mg every 2 weeks. Peak testosterone was >1500 and we decreased his dose to 250mg every 2 weeks.  He is still doing well .  He has mild LUTS.  Nocturia x1.  No bothered.  He underwent ESWL for 12mm stone in left lower pole last year.  He passed fragments and repeat KUB noted only small fragment remaining.  Labs reviewed.  Peak testosterone 1054.  H&H normal.      Review of Systems   Constitutional: Negative for fever.   Genitourinary: Negative for dysuria and hematuria.       Objective:      Physical Exam   Constitutional: He is oriented to person, place, and time. He appears well-developed and well-nourished.   Pulmonary/Chest: Effort normal.   Neurological: He is alert and oriented to person, place, and time.   Skin: No rash noted.   Psychiatric: He has a normal mood and affect.   Vitals reviewed.      Assessment:       1. Low testosterone level in male    2. Screening for prostate cancer        Plan:       Low testosterone level in male  -     Testosterone; Future; Expected date: 11/08/2019  -     Hemoglobin; Future; Expected date: 11/08/2019  -     Hematocrit; Future; Expected date: 11/08/2019    Screening for prostate cancer  -     PSA, Screening; Future; Expected date: 11/08/2019

## 2020-03-19 ENCOUNTER — PATIENT MESSAGE (OUTPATIENT)
Dept: UROLOGY | Facility: CLINIC | Age: 50
End: 2020-03-19

## 2020-03-20 RX ORDER — ONDANSETRON 4 MG/1
TABLET, FILM COATED ORAL
COMMUNITY
Start: 2020-01-28

## 2020-03-20 RX ORDER — DIAZEPAM 5 MG/1
TABLET ORAL
COMMUNITY
Start: 2020-01-28

## 2020-03-30 DIAGNOSIS — E29.1 MALE HYPOGONADISM: ICD-10-CM

## 2020-03-30 RX ORDER — TESTOSTERONE CYPIONATE 200 MG/ML
200 INJECTION, SOLUTION INTRAMUSCULAR
Qty: 10 ML | Refills: 1 | Status: SHIPPED | OUTPATIENT
Start: 2020-03-30 | End: 2021-02-01

## 2020-03-30 NOTE — TELEPHONE ENCOUNTER
----- Message from Talya Palmer sent at 3/30/2020 10:03 AM CDT -----  Contact: [phone]  Patient requesting a refill on testosterone.       Patient will be using   [facility] - [facility] [address] [facility]  [address] [facility] [address]  Phone: [phone] Fax: [phone]    Please call patient at [phone].     Thanks!

## 2021-03-24 ENCOUNTER — OFFICE VISIT (OUTPATIENT)
Dept: UROLOGY | Facility: CLINIC | Age: 51
End: 2021-03-24
Payer: COMMERCIAL

## 2021-03-24 VITALS — WEIGHT: 211.88 LBS | BODY MASS INDEX: 27.19 KG/M2 | HEIGHT: 74 IN

## 2021-03-24 DIAGNOSIS — Z12.5 SCREENING FOR PROSTATE CANCER: ICD-10-CM

## 2021-03-24 DIAGNOSIS — R79.89 LOW TESTOSTERONE LEVEL IN MALE: Primary | ICD-10-CM

## 2021-03-24 DIAGNOSIS — E29.1 MALE HYPOGONADISM: ICD-10-CM

## 2021-03-24 DIAGNOSIS — N40.0 BPH WITHOUT URINARY OBSTRUCTION: ICD-10-CM

## 2021-03-24 PROCEDURE — 99999 PR PBB SHADOW E&M-EST. PATIENT-LVL III: CPT | Mod: PBBFAC,,, | Performed by: UROLOGY

## 2021-03-24 PROCEDURE — 99214 OFFICE O/P EST MOD 30 MIN: CPT | Mod: S$GLB,,, | Performed by: UROLOGY

## 2021-03-24 PROCEDURE — 99999 PR PBB SHADOW E&M-EST. PATIENT-LVL III: ICD-10-PCS | Mod: PBBFAC,,, | Performed by: UROLOGY

## 2021-03-24 PROCEDURE — 3008F PR BODY MASS INDEX (BMI) DOCUMENTED: ICD-10-PCS | Mod: CPTII,S$GLB,, | Performed by: UROLOGY

## 2021-03-24 PROCEDURE — 1126F PR PAIN SEVERITY QUANTIFIED, NO PAIN PRESENT: ICD-10-PCS | Mod: S$GLB,,, | Performed by: UROLOGY

## 2021-03-24 PROCEDURE — 1126F AMNT PAIN NOTED NONE PRSNT: CPT | Mod: S$GLB,,, | Performed by: UROLOGY

## 2021-03-24 PROCEDURE — 99214 PR OFFICE/OUTPT VISIT, EST, LEVL IV, 30-39 MIN: ICD-10-PCS | Mod: S$GLB,,, | Performed by: UROLOGY

## 2021-03-24 PROCEDURE — 3008F BODY MASS INDEX DOCD: CPT | Mod: CPTII,S$GLB,, | Performed by: UROLOGY

## 2021-03-24 RX ORDER — TESTOSTERONE CYPIONATE 200 MG/ML
300 INJECTION, SOLUTION INTRAMUSCULAR
Qty: 10 ML | Refills: 5 | Status: SHIPPED | OUTPATIENT
Start: 2021-03-24 | End: 2021-09-29

## 2022-04-05 ENCOUNTER — TELEPHONE (OUTPATIENT)
Dept: UROLOGY | Facility: CLINIC | Age: 52
End: 2022-04-05
Payer: COMMERCIAL

## 2022-04-14 ENCOUNTER — LAB VISIT (OUTPATIENT)
Dept: LAB | Facility: HOSPITAL | Age: 52
End: 2022-04-14
Attending: UROLOGY
Payer: COMMERCIAL

## 2022-04-14 DIAGNOSIS — Z12.5 SCREENING FOR PROSTATE CANCER: ICD-10-CM

## 2022-04-14 DIAGNOSIS — R79.89 LOW TESTOSTERONE LEVEL IN MALE: ICD-10-CM

## 2022-04-14 LAB
COMPLEXED PSA SERPL-MCNC: 0.93 NG/ML (ref 0–4)
HCT VFR BLD AUTO: 45.5 % (ref 40–54)
HGB BLD-MCNC: 15.6 G/DL (ref 14–18)
TESTOST SERPL-MCNC: 1327 NG/DL (ref 304–1227)

## 2022-04-14 PROCEDURE — 36415 COLL VENOUS BLD VENIPUNCTURE: CPT | Mod: PO | Performed by: UROLOGY

## 2022-04-14 PROCEDURE — 85018 HEMOGLOBIN: CPT | Performed by: UROLOGY

## 2022-04-14 PROCEDURE — 84403 ASSAY OF TOTAL TESTOSTERONE: CPT | Performed by: UROLOGY

## 2022-04-14 PROCEDURE — 84153 ASSAY OF PSA TOTAL: CPT | Performed by: UROLOGY

## 2022-04-14 PROCEDURE — 85014 HEMATOCRIT: CPT | Performed by: UROLOGY

## 2022-04-26 ENCOUNTER — PATIENT MESSAGE (OUTPATIENT)
Dept: UROLOGY | Facility: CLINIC | Age: 52
End: 2022-04-26
Payer: COMMERCIAL

## 2022-04-26 DIAGNOSIS — E29.1 MALE HYPOGONADISM: ICD-10-CM

## 2022-04-26 RX ORDER — TESTOSTERONE CYPIONATE 200 MG/ML
INJECTION, SOLUTION INTRAMUSCULAR
Qty: 10 ML | Refills: 5 | Status: CANCELLED | OUTPATIENT
Start: 2022-04-26

## 2022-05-10 RX ORDER — ONDANSETRON HYDROCHLORIDE 8 MG/1
TABLET, FILM COATED ORAL
COMMUNITY
Start: 2022-03-28

## 2022-05-11 ENCOUNTER — OFFICE VISIT (OUTPATIENT)
Dept: UROLOGY | Facility: CLINIC | Age: 52
End: 2022-05-11
Payer: COMMERCIAL

## 2022-05-11 DIAGNOSIS — E29.1 MALE HYPOGONADISM: ICD-10-CM

## 2022-05-11 DIAGNOSIS — N40.0 BPH WITHOUT URINARY OBSTRUCTION: ICD-10-CM

## 2022-05-11 DIAGNOSIS — R79.89 LOW TESTOSTERONE LEVEL IN MALE: Primary | ICD-10-CM

## 2022-05-11 PROCEDURE — 4010F PR ACE/ARB THEARPY RXD/TAKEN: ICD-10-PCS | Mod: CPTII,S$GLB,, | Performed by: UROLOGY

## 2022-05-11 PROCEDURE — 99214 OFFICE O/P EST MOD 30 MIN: CPT | Mod: S$GLB,,, | Performed by: UROLOGY

## 2022-05-11 PROCEDURE — 1159F MED LIST DOCD IN RCRD: CPT | Mod: CPTII,S$GLB,, | Performed by: UROLOGY

## 2022-05-11 PROCEDURE — 1160F PR REVIEW ALL MEDS BY PRESCRIBER/CLIN PHARMACIST DOCUMENTED: ICD-10-PCS | Mod: CPTII,S$GLB,, | Performed by: UROLOGY

## 2022-05-11 PROCEDURE — 99999 PR PBB SHADOW E&M-EST. PATIENT-LVL III: ICD-10-PCS | Mod: PBBFAC,,, | Performed by: UROLOGY

## 2022-05-11 PROCEDURE — 4010F ACE/ARB THERAPY RXD/TAKEN: CPT | Mod: CPTII,S$GLB,, | Performed by: UROLOGY

## 2022-05-11 PROCEDURE — 1159F PR MEDICATION LIST DOCUMENTED IN MEDICAL RECORD: ICD-10-PCS | Mod: CPTII,S$GLB,, | Performed by: UROLOGY

## 2022-05-11 PROCEDURE — 1160F RVW MEDS BY RX/DR IN RCRD: CPT | Mod: CPTII,S$GLB,, | Performed by: UROLOGY

## 2022-05-11 PROCEDURE — 99999 PR PBB SHADOW E&M-EST. PATIENT-LVL III: CPT | Mod: PBBFAC,,, | Performed by: UROLOGY

## 2022-05-11 PROCEDURE — 99214 PR OFFICE/OUTPT VISIT, EST, LEVL IV, 30-39 MIN: ICD-10-PCS | Mod: S$GLB,,, | Performed by: UROLOGY

## 2022-05-11 RX ORDER — TESTOSTERONE CYPIONATE 200 MG/ML
INJECTION, SOLUTION INTRAMUSCULAR
Qty: 10 ML | Refills: 5 | Status: SHIPPED | OUTPATIENT
Start: 2022-05-11 | End: 2022-12-15

## 2022-05-11 NOTE — PROGRESS NOTES
Subjective:       Patient ID: Richard Grey is a 52 y.o. male.    Chief Complaint: Annual Exam    HPI     52 year old with symptomatic testosterone deficiency.  He has been on IM testosterone replacement for years. He is doing well.   His dose was 300 mg every 2 weeks.  He is still doing well.  He has increased energy and libido and overall feels well.  He has mild LUTS.  Nocturia x1  but is not bothered by his symptoms.  He also has history of stones and previous shockwave lithotripsy.      Component    4/14/2022   PSA, Screen 0.93   Testosterone, Total 1327 (H)   Hemoglobin 15.6   Hematocrit 45.5       Review of Systems   Constitutional: Negative for fever.   Genitourinary: Negative for dysuria and hematuria.       Objective:      Physical Exam  Vitals reviewed.   Constitutional:       Appearance: He is well-developed.   HENT:      Head: Normocephalic and atraumatic.   Eyes:      Conjunctiva/sclera: Conjunctivae normal.   Cardiovascular:      Rate and Rhythm: Normal rate.   Pulmonary:      Effort: Pulmonary effort is normal.   Genitourinary:     Prostate: Enlarged (30g, s/s/a). Not tender.      Rectum: No mass. Normal anal tone.   Musculoskeletal:         General: Normal range of motion.   Skin:     General: Skin is warm and dry.      Findings: No rash.   Neurological:      Mental Status: He is alert and oriented to person, place, and time.         Assessment:       1. Low testosterone level in male    2. BPH without urinary obstruction    3. Male hypogonadism        Plan:       Low testosterone level in male    BPH without urinary obstruction    Male hypogonadism  -     testosterone cypionate (DEPOTESTOTERONE CYPIONATE) 200 mg/mL injection; INJECT ONE AND A HALF (1 & 1/2) MILLILITERS (300 MG TOTAL) INTO THE MUSCLE EVERY 14 (FOURTEEN) DAYS.  Dispense: 10 mL; Refill: 5      continue testosterone at current dose.  Annual follow-up.

## 2023-06-26 ENCOUNTER — TELEPHONE (OUTPATIENT)
Dept: UROLOGY | Facility: CLINIC | Age: 53
End: 2023-06-26
Payer: COMMERCIAL

## 2023-06-26 DIAGNOSIS — E29.1 MALE HYPOGONADISM: ICD-10-CM

## 2023-06-26 RX ORDER — TESTOSTERONE CYPIONATE 200 MG/ML
300 INJECTION, SOLUTION INTRAMUSCULAR
Qty: 10 ML | Refills: 5 | Status: SHIPPED | OUTPATIENT
Start: 2023-06-26 | End: 2024-01-18

## 2023-06-26 NOTE — TELEPHONE ENCOUNTER
----- Message from Kathie Solis MA sent at 6/23/2023  1:04 PM CDT -----    ----- Message -----  From: Christiane Amaya  Sent: 6/23/2023   9:26 AM CDT  To: SHELBY Vasquez Staff [facility]    Type:  RX Refill Request    Who Called:  Pt     Refill or New Rx: Refill     RX Name and Strength:testosterone cypionate (DEPOTESTOTERONE CYPIONATE) 200 mg/mL injection    How is the patient currently taking it? Sig: INJECT ONE AND A HALF (1 & 1/2) ML INTRAMUSCULARLY EVERY 14 DAYS  Sent to pharmacy as: testosterone cypionate (DEPOTESTOTERONE CYPIONATE) 200 mg/mL injection  Class: Normal        Is this a 30 day or 90 day RX:    Preferred Pharmacy with phone number:[facility] - [place], [place] - [address] [address]\""    Local or Mail Order: local     Ordering Provider: SHELBY Vasquez MD    Would the patient rather a call back or a response via [facility]?  Call back     Best Call Back Number: [phone] (mobile)     Additional Information:

## 2024-01-18 DIAGNOSIS — E29.1 MALE HYPOGONADISM: ICD-10-CM

## 2024-01-18 RX ORDER — TESTOSTERONE CYPIONATE 200 MG/ML
INJECTION, SOLUTION INTRAMUSCULAR
Qty: 10 ML | Refills: 5 | Status: SHIPPED | OUTPATIENT
Start: 2024-01-18

## 2024-05-23 ENCOUNTER — PATIENT MESSAGE (OUTPATIENT)
Dept: UROLOGY | Facility: CLINIC | Age: 54
End: 2024-05-23
Payer: COMMERCIAL

## 2024-08-13 DIAGNOSIS — E29.1 MALE HYPOGONADISM: ICD-10-CM

## 2024-08-13 RX ORDER — TESTOSTERONE CYPIONATE 200 MG/ML
300 INJECTION, SOLUTION INTRAMUSCULAR
Qty: 10 ML | Refills: 5 | Status: SHIPPED | OUTPATIENT
Start: 2024-08-13

## 2025-03-01 DIAGNOSIS — E29.1 MALE HYPOGONADISM: ICD-10-CM

## 2025-03-05 RX ORDER — TESTOSTERONE CYPIONATE 200 MG/ML
INJECTION, SOLUTION INTRAMUSCULAR
Qty: 10 ML | Refills: 5 | Status: SHIPPED | OUTPATIENT
Start: 2025-03-05